# Patient Record
Sex: FEMALE | Race: WHITE | NOT HISPANIC OR LATINO | Employment: UNEMPLOYED | URBAN - METROPOLITAN AREA
[De-identification: names, ages, dates, MRNs, and addresses within clinical notes are randomized per-mention and may not be internally consistent; named-entity substitution may affect disease eponyms.]

---

## 2021-03-03 DIAGNOSIS — Z23 NEED FOR VACCINATION: ICD-10-CM

## 2025-04-15 ENCOUNTER — HOSPITAL ENCOUNTER (OUTPATIENT)
Facility: MEDICAL CENTER | Age: 75
End: 2025-04-15
Attending: STUDENT IN AN ORGANIZED HEALTH CARE EDUCATION/TRAINING PROGRAM
Payer: MEDICARE

## 2025-04-15 ENCOUNTER — OFFICE VISIT (OUTPATIENT)
Dept: MEDICAL GROUP | Age: 75
End: 2025-04-15
Payer: MEDICARE

## 2025-04-15 VITALS
HEART RATE: 92 BPM | BODY MASS INDEX: 37.72 KG/M2 | WEIGHT: 226.4 LBS | OXYGEN SATURATION: 95 % | TEMPERATURE: 97.4 F | SYSTOLIC BLOOD PRESSURE: 130 MMHG | HEIGHT: 65 IN | DIASTOLIC BLOOD PRESSURE: 76 MMHG

## 2025-04-15 DIAGNOSIS — I1A.0 RESISTANT HYPERTENSION: ICD-10-CM

## 2025-04-15 DIAGNOSIS — N95.1 MENOPAUSAL STATE: ICD-10-CM

## 2025-04-15 DIAGNOSIS — E78.5 DYSLIPIDEMIA: ICD-10-CM

## 2025-04-15 DIAGNOSIS — G89.4 CHRONIC PAIN SYNDROME: ICD-10-CM

## 2025-04-15 DIAGNOSIS — Z12.12 SCREENING FOR COLORECTAL CANCER: ICD-10-CM

## 2025-04-15 DIAGNOSIS — Z78.0 POSTMENOPAUSAL STATUS (AGE-RELATED) (NATURAL): ICD-10-CM

## 2025-04-15 DIAGNOSIS — Z87.891 HISTORY OF CIGARETTE SMOKING: ICD-10-CM

## 2025-04-15 DIAGNOSIS — F17.211 NICOTINE DEPENDENCE, CIGARETTES, IN REMISSION: ICD-10-CM

## 2025-04-15 DIAGNOSIS — Z12.31 ENCOUNTER FOR SCREENING MAMMOGRAM FOR BREAST CANCER: ICD-10-CM

## 2025-04-15 DIAGNOSIS — Z12.11 SCREENING FOR COLORECTAL CANCER: ICD-10-CM

## 2025-04-15 DIAGNOSIS — E11.9 TYPE 2 DIABETES MELLITUS WITHOUT COMPLICATION, WITHOUT LONG-TERM CURRENT USE OF INSULIN (HCC): ICD-10-CM

## 2025-04-15 DIAGNOSIS — Z11.59 NEED FOR HEPATITIS C SCREENING TEST: ICD-10-CM

## 2025-04-15 DIAGNOSIS — E66.812 CLASS 2 OBESITY DUE TO EXCESS CALORIES WITHOUT SERIOUS COMORBIDITY WITH BODY MASS INDEX (BMI) OF 38.0 TO 38.9 IN ADULT: ICD-10-CM

## 2025-04-15 DIAGNOSIS — F17.201 TOBACCO ABUSE, IN REMISSION: ICD-10-CM

## 2025-04-15 DIAGNOSIS — E66.09 CLASS 2 OBESITY DUE TO EXCESS CALORIES WITHOUT SERIOUS COMORBIDITY WITH BODY MASS INDEX (BMI) OF 38.0 TO 38.9 IN ADULT: ICD-10-CM

## 2025-04-15 DIAGNOSIS — R47.01 EXPRESSIVE APHASIA SYNDROME: ICD-10-CM

## 2025-04-15 DIAGNOSIS — R79.89 LOW VITAMIN D LEVEL: ICD-10-CM

## 2025-04-15 DIAGNOSIS — I63.9 CEREBRAL INFARCTION, UNSPECIFIED MECHANISM (HCC): ICD-10-CM

## 2025-04-15 PROCEDURE — 3078F DIAST BP <80 MM HG: CPT | Performed by: STUDENT IN AN ORGANIZED HEALTH CARE EDUCATION/TRAINING PROGRAM

## 2025-04-15 PROCEDURE — 3075F SYST BP GE 130 - 139MM HG: CPT | Performed by: STUDENT IN AN ORGANIZED HEALTH CARE EDUCATION/TRAINING PROGRAM

## 2025-04-15 PROCEDURE — 99204 OFFICE O/P NEW MOD 45 MIN: CPT | Performed by: STUDENT IN AN ORGANIZED HEALTH CARE EDUCATION/TRAINING PROGRAM

## 2025-04-15 PROCEDURE — 80307 DRUG TEST PRSMV CHEM ANLYZR: CPT

## 2025-04-15 RX ORDER — EZETIMIBE 10 MG/1
10 TABLET ORAL DAILY
COMMUNITY
End: 2025-04-15 | Stop reason: SDUPTHER

## 2025-04-15 RX ORDER — ASPIRIN 81 MG/1
81 TABLET ORAL DAILY
Qty: 100 TABLET | Refills: 3 | Status: SHIPPED | OUTPATIENT
Start: 2025-04-15

## 2025-04-15 RX ORDER — AMLODIPINE BESYLATE 10 MG/1
10 TABLET ORAL DAILY
COMMUNITY
End: 2025-04-15

## 2025-04-15 RX ORDER — AMLODIPINE BESYLATE 10 MG/1
10 TABLET ORAL DAILY
Qty: 100 TABLET | Refills: 3 | Status: SHIPPED | OUTPATIENT
Start: 2025-04-15

## 2025-04-15 RX ORDER — LISINOPRIL 40 MG/1
40 TABLET ORAL DAILY
Qty: 100 TABLET | Refills: 3 | Status: SHIPPED | OUTPATIENT
Start: 2025-04-15

## 2025-04-15 RX ORDER — SIMVASTATIN 40 MG
40 TABLET ORAL NIGHTLY
Qty: 100 TABLET | Refills: 3 | Status: SHIPPED | OUTPATIENT
Start: 2025-04-15 | End: 2026-05-20

## 2025-04-15 RX ORDER — EZETIMIBE 10 MG/1
10 TABLET ORAL DAILY
Qty: 100 TABLET | Refills: 3 | Status: SHIPPED | OUTPATIENT
Start: 2025-04-15

## 2025-04-15 RX ORDER — LISINOPRIL 40 MG/1
40 TABLET ORAL DAILY
COMMUNITY
End: 2025-04-15 | Stop reason: SDUPTHER

## 2025-04-15 RX ORDER — PIOGLITAZONE 30 MG/1
30 TABLET ORAL DAILY
COMMUNITY
End: 2025-04-15 | Stop reason: SDUPTHER

## 2025-04-15 RX ORDER — METOPROLOL SUCCINATE 50 MG/1
50 TABLET, EXTENDED RELEASE ORAL DAILY
Qty: 100 TABLET | Refills: 3 | Status: SHIPPED | OUTPATIENT
Start: 2025-04-15

## 2025-04-15 RX ORDER — TRAMADOL HYDROCHLORIDE 50 MG/1
50 TABLET ORAL EVERY 4 HOURS PRN
COMMUNITY
End: 2025-04-15 | Stop reason: SDUPTHER

## 2025-04-15 RX ORDER — TRAMADOL HYDROCHLORIDE 50 MG/1
50 TABLET ORAL EVERY 12 HOURS PRN
Qty: 60 TABLET | Refills: 0 | Status: SHIPPED | OUTPATIENT
Start: 2025-04-15 | End: 2025-05-15

## 2025-04-15 RX ORDER — PIOGLITAZONE 30 MG/1
30 TABLET ORAL DAILY
Qty: 100 TABLET | Refills: 3 | Status: SHIPPED | OUTPATIENT
Start: 2025-04-15

## 2025-04-15 ASSESSMENT — PATIENT HEALTH QUESTIONNAIRE - PHQ9: CLINICAL INTERPRETATION OF PHQ2 SCORE: 0

## 2025-04-16 NOTE — PROGRESS NOTES
Verbal consent was acquired by the patient to use [employer] ambient listening note generation during this visit     Subjective:     HPI:   History of Present Illness  The patient is a 75-year-old female who presents to establish care. She is accompanied by her daughter.    The patient's daughter reports that the patient has recently relocated from [place], [place], and requires the establishment of primary care. She was previously receiving her medications through a pill pack service, which has been inconsistent, resulting in a lack of medication since the previous week. The patient has expressive aphasia following a stroke, limiting her ability to form complete sentences. She does not have a neurologist in [place] and has not consulted one annually or weekly. Her condition remains stable on her current regimen.     She has been on baby aspirin since her stroke 17 years ago and experiences bruising. She has no history of heart attack or other cardiac issues. She has diabetes but does not recall her last A1c level. Her last lab work was conducted 4 months ago. She has no history of alcohol abuse or psychiatric disorders.     She was initially prescribed Vicodin for pain management post-stroke, which was later switched to tramadol. She experiences constant pain in her legs and arms, including the joints, and takes tramadol twice daily. Her primary care physician in [place] was providing a 7-day supply of tramadol monthly. She last refilled her tramadol prescription on 2025.    PAST SURGICAL HISTORY:  - Total abdominal hysterectomy in  for precancerous conditions  - Gallbladder removal in   - Cataract surgery in 2019    SOCIAL HISTORY  She does not consume alcohol. She has a 55-year history of smoking but has since quit.    FAMILY HISTORY  Her father had liver cancer and  at age 49. Her brother had lung cancer. Her mother had heart problems and experienced TIAs. Her granddaughter  "had a rare form of sarcoma that started in her shoulder and spread to her lungs, diagnosed at age 32.    Health Maintenance: Completed  No n/v/d/c, fever, chills, sob, chest pain      Objective:     Exam:  /76 (BP Location: Left arm, Patient Position: Sitting, BP Cuff Size: Large adult long)   Pulse 92   Temp 36.3 °C (97.4 °F) (Temporal)   Ht 1.638 m (5' 4.5\")   Wt 103 kg (226 lb 6.4 oz)   SpO2 95%   BMI 38.26 kg/m²  Body mass index is 38.26 kg/m².    Physical Exam  Gen: nad  HENT: ncat, EOMI  Resp: ctabl  Cardiac: rrr, no m  GI: nt/nd  Neuro: cn 2-12 intact throughout, decreased sensation to light touch in left arm  Psych: appropriate mood and affect      Results  I    Assessment & Plan:     1. Postmenopausal status (age-related) (natural)  DS-BONE DENSITY STUDY (DEXA)      2. Menopausal state  DS-BONE DENSITY STUDY (DEXA)      3. Encounter for screening mammogram for breast cancer  MA-SCREENING MAMMO BILAT W/CAD      4. Screening for colorectal cancer  Referral to GI for Colonoscopy      5. Dyslipidemia  simvastatin (ZOCOR) 40 MG Tab    ezetimibe (ZETIA) 10 MG Tab    Comp Metabolic Panel    HEMOGLOBIN A1C    Lipid Profile      6. Resistant hypertension  lisinopril (PRINIVIL) 40 MG tablet    amLODIPine (NORVASC) 10 MG Tab    metoprolol SR (TOPROL XL) 50 MG TABLET SR 24 HR    CBC WITHOUT DIFFERENTIAL    MICROALBUMIN CREAT RATIO URINE      7. Class 2 obesity due to excess calories without serious comorbidity with body mass index (BMI) of 38.0 to 38.9 in adult  CBC WITHOUT DIFFERENTIAL    Comp Metabolic Panel    HEMOGLOBIN A1C    Lipid Profile    MICROALBUMIN CREAT RATIO URINE    TSH WITH REFLEX TO FT4    VITAMIN D,25 HYDROXY (DEFICIENCY)    VITAMIN B12      8. Tobacco abuse, in remission  REFERRAL TO LUNG CANCER SCREENING PROGRAM      9. Expressive aphasia syndrome        10. Cerebral infarction, unspecified mechanism (HCC)  aspirin 81 MG EC tablet    CBC WITHOUT DIFFERENTIAL      11. Chronic pain " syndrome  Referral to Pain Management - Chronic Opioid Therapy    URINE DRUG SCREEN    traMADol (ULTRAM) 50 MG Tab    Consent for Opiate Prescription    Controlled Substance Treatment Agreement      12. Type 2 diabetes mellitus without complication, without long-term current use of insulin (HCC)  pioglitazone (ACTOS) 30 MG Tab    metFORMIN (GLUCOPHAGE) 850 MG Tab    Comp Metabolic Panel    HEMOGLOBIN A1C    MICROALBUMIN CREAT RATIO URINE    TSH WITH REFLEX TO FT4    VITAMIN D,25 HYDROXY (DEFICIENCY)    VITAMIN B12      13. Low vitamin D level  VITAMIN D,25 HYDROXY (DEFICIENCY)      14. Need for hepatitis C screening test  HEP C VIRUS ANTIBODY      15. Nicotine dependence, cigarettes, in remission  REFERRAL TO LUNG CANCER SCREENING PROGRAM      16. History of cigarette smoking  REFERRAL TO LUNG CANCER SCREENING PROGRAM          Assessment & Plan  1. Ho CVA with mild residual deficits  Her blood pressure and oxygen saturation levels are within normal ranges. She has a history of atherosclerotic ischemic stroke in 2008, which has resulted in mild aphasia and decreased sensation to light touch in RUE, although her strength appears to be satisfactory.  She has a significant smoking history of 55 years but has since ceased this habit. A comprehensive set of laboratory tests will be ordered to be conducted. Cont baby ASA daily.    2. Chronic pain syndrome.  Her pain appears to be more indicative of fibromyalgia, necessitating further investigation. A referral to a pain clinic will be made for further evaluation and management of her chronic pain syndrome. A urine drug test will be ordered. A controlled substance agreement will be prepared, which stipulates that if any irregularities are found in the urine drug screen, the right to refuse prescriptions is reserved. If she is found to be filling prescriptions at multiple locations or with multiple providers, the right to discontinue the prescription is also reserved. Will  provide on month supply of tramadol while pt is establishing with pain management.     3. HTN  4. T2DM  5. HLD  She needs to re-establish her medication regimen after a disruption in her pill pack service. Prescriptions for simvastatin 40 mg daily, zetia 10 mg qd, amlodipine 10mg qd, lisinopril 40 mg daily, metoprolol SR 50 mg qd, metformin 850 mg bid and piaglitazone 30 mg daily. She will continue taking baby aspirin due to her history of stroke.    Follow-up  The patient is scheduled for a follow-up visit in July 2025 for an annual checkup.        Return in about 3 months (around 7/15/2025) for annual visit, lab results.    Please note that this dictation was created using voice recognition software. I have made every reasonable attempt to correct obvious errors, but I expect that there are errors of grammar and possibly content that I did not discover before finalizing the note.

## 2025-04-17 ENCOUNTER — RESULTS FOLLOW-UP (OUTPATIENT)
Dept: MEDICAL GROUP | Age: 75
End: 2025-04-17

## 2025-04-17 LAB
AMPHET CTO UR CFM-MCNC: NEGATIVE NG/ML
BARBITURATES CTO UR CFM-MCNC: NEGATIVE NG/ML
BENZODIAZ CTO UR CFM-MCNC: NEGATIVE NG/ML
CANNABINOIDS CTO UR CFM-MCNC: NEGATIVE NG/ML
COCAINE CTO UR CFM-MCNC: NEGATIVE NG/ML
CREAT UR-MCNC: 187.8 MG/DL (ref 20–400)
DRUG COMMENT 753798: NORMAL
METHADONE CTO UR CFM-MCNC: NEGATIVE NG/ML
OPIATES CTO UR CFM-MCNC: NEGATIVE NG/ML
PCP CTO UR CFM-MCNC: NEGATIVE NG/ML
PROPOXYPH CTO UR CFM-MCNC: NEGATIVE NG/ML

## 2025-04-29 ENCOUNTER — TELEPHONE (OUTPATIENT)
Dept: HEALTH INFORMATION MANAGEMENT | Facility: OTHER | Age: 75
End: 2025-04-29

## 2025-04-29 NOTE — TELEPHONE ENCOUNTER
Outcome: Patient will callback.      Patient has an active order for CT-Lung Cancer Screening that is ready to schedule. Please transfer to Patient Outreach Team at [phone] when patient returns call.        Attempt # 1

## 2025-05-01 NOTE — TELEPHONE ENCOUNTER
"Lung Cancer Screening Q&A  -please ensure accurate responses to all questions-    Referred by:  VICKIE FIERRO    Inclusion Criteria:  - patient must meet all criteria-    Age: 50-77yrs of age, if patient is currently 78yrs old, please ensure there will be enough time for them to complete ILCS and LDCT before 79th birthday.        PLEASE ENTER PATIENTS AGE: 75 yrs    2. Current smoker or if quit, has pt quit within last 15 yrs? Former, quit 7 yrs ago    3. What is the total number of years patient has smoked cigarettes? 50 yrs    4. What is the average packs per day over total years smoking? (Can be mulitple answers) 1.5 packs  - (EX: 20 yrs total : 5 years at 2 packs per day, 15 years at 1 pack per day.)    5. Does patient meet minimum requirement of 20 PACK YEAR HISTORY?  YES            Calculate pack year History. [Total years x average packs per day = pack yr history]          75 pack year history     Exclusion Criteria  - patients should have not completed a CT chest imaging in the last 12 months, patients cannot have prior lung cancer DX, any nodules on prior CT chest imaging must be \"stable\" and not newly identified, patient should have no new, worsening, unexplained symptoms associated with active lung cancer.    5. Has patient ever completed a Lung Cancer Screening CT? YES   - if yes, where was it completed? [facility]  - if yes, please include last LDCT date. N/A    6. Has patient completed any CT Chest imaging? NO  - If so when was last CT chest imaging? N/A    7. On any prior CT chest imaging was anything noted for the Lungs? NO      8. Has patient developed any of the following symptoms that are new or worsening in the last 12 months and unattributed to an existing DX ?  NO         9. Previous History of Lung Cancer? NO  - if yes, include DX date ,specific cancer DX , and oncologist if available.      Patient Meets all Inclusion / exclusion criteria?  YES     "

## 2025-05-14 NOTE — PROGRESS NOTES
Subjective     Munira Monsivais is a 75 y.o. female who presents with No chief complaint on file.            HPI  Patient seen today for initial lung cancer screening visit. Patient referred by Dr. Grzegorz Paez.     The patient meets eligibility criteria including age, smoking history (20+ pack years), if former smoker, quit in the last 15 years, and absence of signs or symptoms of lung cancer.    - Age - 75  - Smoking history - Patient has smoked for *** years at an average of *** ppd = *** pack year smoking history.  - Current smoking status - ***  - No symptoms of lung cancer and no previous history of lung cancer     Allergies[1]    Medications Ordered Prior to Encounter[2]    ROS           Objective     There were no vitals taken for this visit.     Physical Exam                             Assessment & Plan      We conducted a shared decision-making process using a decision aid. We reviewed benefits and harms of screening, including false positives and potential need for additional diagnostic testing, the possibility of over diagnosis, and total radiation exposure.    We discussed the importance of adhering to annual LDCT screening. We also discussed the impact of comorbities on the patient's the ability or willingness to undergo diagnostic procedure(s) and treatment.    {AMB SHARED DECISION LUNG CANCER SCREENING SMOKING ABSTINENCE:5042666}    Based on our discussion, we have decided {AMB SHARED DECISION LUNG CANCER SCREENING OUTCOMES:8995103}    *** is interested in participating in research regarding lung cancer screening. *** contact information was given to the research team.     No follow-up needed                    [1]   Allergies  Allergen Reactions    Codeine Itching and Swelling   [2]   Current Outpatient Medications on File Prior to Visit   Medication Sig Dispense Refill    simvastatin (ZOCOR) 40 MG Tab Take 1 Tablet by mouth every evening. 100 Tablet 3    lisinopril (PRINIVIL) 40 MG tablet Take 1  Tablet by mouth every day. 100 Tablet 3    pioglitazone (ACTOS) 30 MG Tab Take 1 Tablet by mouth every day. 100 Tablet 3    ezetimibe (ZETIA) 10 MG Tab Take 1 Tablet by mouth every day. 100 Tablet 3    amLODIPine (NORVASC) 10 MG Tab Take 1 Tablet by mouth every day. 100 Tablet 3    aspirin 81 MG EC tablet Take 1 Tablet by mouth every day. 100 Tablet 3    metoprolol SR (TOPROL XL) 50 MG TABLET SR 24 HR Take 1 Tablet by mouth every day. 100 Tablet 3    metFORMIN (GLUCOPHAGE) 850 MG Tab Take 1 Tablet by mouth 2 times a day with meals. 200 Tablet 3    traMADol (ULTRAM) 50 MG Tab Take 1 Tablet by mouth every 12 hours as needed for Severe Pain for up to 30 days. 60 Tablet 0     No current facility-administered medications on file prior to visit.

## 2025-05-21 ENCOUNTER — APPOINTMENT (OUTPATIENT)
Dept: SLEEP MEDICINE | Facility: MEDICAL CENTER | Age: 75
End: 2025-05-21
Attending: FAMILY MEDICINE
Payer: COMMERCIAL